# Patient Record
Sex: FEMALE | URBAN - METROPOLITAN AREA
[De-identification: names, ages, dates, MRNs, and addresses within clinical notes are randomized per-mention and may not be internally consistent; named-entity substitution may affect disease eponyms.]

---

## 2017-09-15 ENCOUNTER — IMPORTED ENCOUNTER (OUTPATIENT)
Dept: URBAN - METROPOLITAN AREA CLINIC 59 | Facility: CLINIC | Age: 47
End: 2017-09-15

## 2017-09-15 PROBLEM — H43.811 VITREOUS DETACHMENT OF RT EYE: Noted: 2017-09-15

## 2017-09-15 PROBLEM — H33.8 OTHER RETINAL DETACHMENTS: Noted: 2017-09-15

## 2017-09-15 PROBLEM — H04.122 TEAR FILM INSUFFICIENCY OF LT LACRIMAL GLAND: Noted: 2017-09-15

## 2017-09-15 PROBLEM — H04.121 TEAR FILM INSUFFICIENCY OF RT LACRIMAL GLAND: Noted: 2017-09-15

## 2017-09-15 PROBLEM — H43.813 VITREOUS DEGENERATION, BILATERAL: Noted: 2017-09-15

## 2017-09-15 PROBLEM — H44.23 DEGENERATIVE MYOPIA, BILATERAL: Noted: 2017-09-15

## 2017-09-15 PROBLEM — H25.13 BILATERAL NUCLEAR SCLEROSIS CATARACTS: Noted: 2017-09-15

## 2017-09-15 PROCEDURE — 99204 OFFICE O/P NEW MOD 45 MIN: CPT

## 2019-03-01 ENCOUNTER — IMPORTED ENCOUNTER (OUTPATIENT)
Dept: URBAN - METROPOLITAN AREA CLINIC 59 | Facility: CLINIC | Age: 49
End: 2019-03-01

## 2019-03-01 PROBLEM — H43.811 VITREOUS DETACHMENT OF RT EYE: Noted: 2019-03-01

## 2019-03-01 PROBLEM — H43.813 VITREOUS DEGENERATION, BILATERAL: Noted: 2019-03-01

## 2019-03-01 PROBLEM — H44.23 DEGENERATIVE MYOPIA, BILATERAL: Noted: 2019-03-01

## 2019-03-01 PROBLEM — H25.13 BILATERAL NUCLEAR SCLEROSIS CATARACTS: Noted: 2019-03-01

## 2019-03-01 PROBLEM — H04.122 TEAR FILM INSUFFICIENCY OF LT LACRIMAL GLAND: Noted: 2019-03-01

## 2019-03-01 PROBLEM — H04.121 TEAR FILM INSUFFICIENCY OF RT LACRIMAL GLAND: Noted: 2019-03-01

## 2019-03-01 PROBLEM — H33.8 OTHER RETINAL DETACHMENTS: Noted: 2019-03-01

## 2019-03-01 PROCEDURE — 92014 COMPRE OPH EXAM EST PT 1/>: CPT

## 2019-11-19 ENCOUNTER — APPOINTMENT (RX ONLY)
Dept: URBAN - METROPOLITAN AREA CLINIC 348 | Facility: CLINIC | Age: 49
Setting detail: DERMATOLOGY
End: 2019-11-19

## 2019-11-19 DIAGNOSIS — L81.4 OTHER MELANIN HYPERPIGMENTATION: ICD-10-CM

## 2019-11-19 DIAGNOSIS — L82.1 OTHER SEBORRHEIC KERATOSIS: ICD-10-CM

## 2019-11-19 DIAGNOSIS — D485 NEOPLASM OF UNCERTAIN BEHAVIOR OF SKIN: ICD-10-CM

## 2019-11-19 DIAGNOSIS — D22 MELANOCYTIC NEVI: ICD-10-CM

## 2019-11-19 DIAGNOSIS — D18.0 HEMANGIOMA: ICD-10-CM

## 2019-11-19 PROBLEM — E78.5 HYPERLIPIDEMIA, UNSPECIFIED: Status: ACTIVE | Noted: 2019-11-19

## 2019-11-19 PROBLEM — D48.5 NEOPLASM OF UNCERTAIN BEHAVIOR OF SKIN: Status: ACTIVE | Noted: 2019-11-19

## 2019-11-19 PROBLEM — D22.39 MELANOCYTIC NEVI OF OTHER PARTS OF FACE: Status: ACTIVE | Noted: 2019-11-19

## 2019-11-19 PROBLEM — D22.5 MELANOCYTIC NEVI OF TRUNK: Status: ACTIVE | Noted: 2019-11-19

## 2019-11-19 PROBLEM — D18.01 HEMANGIOMA OF SKIN AND SUBCUTANEOUS TISSUE: Status: ACTIVE | Noted: 2019-11-19

## 2019-11-19 PROCEDURE — 11102 TANGNTL BX SKIN SINGLE LES: CPT

## 2019-11-19 PROCEDURE — 99203 OFFICE O/P NEW LOW 30 MIN: CPT | Mod: 25

## 2019-11-19 PROCEDURE — ? COUNSELING

## 2019-11-19 PROCEDURE — ? DEFER

## 2019-11-19 PROCEDURE — ? REFERRAL

## 2019-11-19 PROCEDURE — ? BIOPSY BY SHAVE METHOD

## 2019-11-19 ASSESSMENT — LOCATION DETAILED DESCRIPTION DERM
LOCATION DETAILED: LEFT MID-UPPER BACK
LOCATION DETAILED: LEFT INFERIOR CENTRAL MALAR CHEEK
LOCATION DETAILED: LEFT INFERIOR UPPER BACK
LOCATION DETAILED: RIGHT SUPERIOR UPPER BACK
LOCATION DETAILED: RIGHT MID-UPPER BACK

## 2019-11-19 ASSESSMENT — LOCATION ZONE DERM
LOCATION ZONE: FACE
LOCATION ZONE: TRUNK

## 2019-11-19 ASSESSMENT — LOCATION SIMPLE DESCRIPTION DERM
LOCATION SIMPLE: LEFT CHEEK
LOCATION SIMPLE: RIGHT UPPER BACK
LOCATION SIMPLE: LEFT UPPER BACK

## 2019-11-19 NOTE — PROCEDURE: BIOPSY BY SHAVE METHOD
Bill For Surgical Tray: no
Silver Nitrate Text: The wound bed was treated with silver nitrate after the biopsy was performed.
Consent: Verbal consent was obtained and risks were reviewed including but not limited to scarring, infection, bleeding, scabbing, incomplete removal, nerve damage and allergy to anesthesia.
Billing Type: Third-Party Bill
Type Of Destruction Used: Curettage
Detail Level: Detailed
Wound Care: Aquaphor
Size Of Lesion In Cm: 0
Post-Care Instructions: I reviewed with the patient in detail post-care instructions. Patient is to keep the biopsy site dry overnight, and then apply aquaphor  twice daily until healed. Patient may apply hydrogen peroxide soaks to remove any crusting.
Dressing: bandage
Depth Of Biopsy: dermis
Biopsy Type: H and E
Notification Instructions: Signed consent obtained for patient to return to office in 2-4 week for biopsy results. Patient understands results will not be given over the phone.
Electrodesiccation Text: The wound bed was treated with electrodesiccation after the biopsy was performed.
Was A Bandage Applied: Yes
Curettage Text: The wound bed was treated with curettage after the biopsy was performed.
Cryotherapy Text: The wound bed was treated with cryotherapy after the biopsy was performed.
Anesthesia Volume In Cc (Will Not Render If 0): 0.5
Electrodesiccation And Curettage Text: The wound bed was treated with electrodesiccation and curettage after the biopsy was performed.
Hemostasis: Aluminum Chloride
Anesthesia Type: 1% lidocaine with 1:100,000 epinephrine and a 1:6 solution of 8.4% sodium bicarbonate

## 2020-02-21 ENCOUNTER — IMPORTED ENCOUNTER (OUTPATIENT)
Dept: URBAN - METROPOLITAN AREA CLINIC 59 | Facility: CLINIC | Age: 50
End: 2020-02-21

## 2020-02-21 PROBLEM — H04.121 TEAR FILM INSUFFICIENCY OF RT LACRIMAL GLAND: Noted: 2020-02-21

## 2020-02-21 PROBLEM — H25.13 BILATERAL NUCLEAR SCLEROSIS CATARACTS: Noted: 2020-02-21

## 2020-02-21 PROBLEM — H04.122 TEAR FILM INSUFFICIENCY OF LT LACRIMAL GLAND: Noted: 2020-02-21

## 2020-02-21 PROBLEM — H33.8 OTHER RETINAL DETACHMENTS: Noted: 2020-02-21

## 2020-02-21 PROBLEM — H43.811 VITREOUS DETACHMENT OF RT EYE: Noted: 2020-02-21

## 2020-02-21 PROCEDURE — 99214 OFFICE O/P EST MOD 30 MIN: CPT

## 2020-02-26 ENCOUNTER — IMPORTED ENCOUNTER (OUTPATIENT)
Dept: URBAN - METROPOLITAN AREA CLINIC 59 | Facility: CLINIC | Age: 50
End: 2020-02-26

## 2020-02-26 PROBLEM — H44.23 DEGENERATIVE MYOPIA, BILATERAL: Noted: 2020-02-26

## 2020-02-26 PROCEDURE — 92312 CONTACT LENS FITG APHAKIA OU: CPT

## 2021-01-20 ENCOUNTER — APPOINTMENT (RX ONLY)
Dept: URBAN - METROPOLITAN AREA CLINIC 348 | Facility: CLINIC | Age: 51
Setting detail: DERMATOLOGY
End: 2021-01-20

## 2021-01-20 DIAGNOSIS — D22 MELANOCYTIC NEVI: ICD-10-CM

## 2021-01-20 DIAGNOSIS — L82.1 OTHER SEBORRHEIC KERATOSIS: ICD-10-CM

## 2021-01-20 DIAGNOSIS — D485 NEOPLASM OF UNCERTAIN BEHAVIOR OF SKIN: ICD-10-CM

## 2021-01-20 DIAGNOSIS — D18.0 HEMANGIOMA: ICD-10-CM

## 2021-01-20 DIAGNOSIS — L81.4 OTHER MELANIN HYPERPIGMENTATION: ICD-10-CM

## 2021-01-20 PROBLEM — D18.01 HEMANGIOMA OF SKIN AND SUBCUTANEOUS TISSUE: Status: ACTIVE | Noted: 2021-01-20

## 2021-01-20 PROBLEM — I10 ESSENTIAL (PRIMARY) HYPERTENSION: Status: ACTIVE | Noted: 2021-01-20

## 2021-01-20 PROBLEM — D48.5 NEOPLASM OF UNCERTAIN BEHAVIOR OF SKIN: Status: ACTIVE | Noted: 2021-01-20

## 2021-01-20 PROBLEM — D22.5 MELANOCYTIC NEVI OF TRUNK: Status: ACTIVE | Noted: 2021-01-20

## 2021-01-20 PROCEDURE — 11300 SHAVE SKIN LESION 0.5 CM/<: CPT | Mod: 76

## 2021-01-20 PROCEDURE — ? SHAVE REMOVAL

## 2021-01-20 PROCEDURE — ? COUNSELING

## 2021-01-20 PROCEDURE — 11300 SHAVE SKIN LESION 0.5 CM/<: CPT

## 2021-01-20 PROCEDURE — 99213 OFFICE O/P EST LOW 20 MIN: CPT | Mod: 25

## 2021-01-20 ASSESSMENT — LOCATION ZONE DERM: LOCATION ZONE: TRUNK

## 2021-01-20 ASSESSMENT — LOCATION DETAILED DESCRIPTION DERM
LOCATION DETAILED: EPIGASTRIC SKIN
LOCATION DETAILED: LEFT MID-UPPER BACK
LOCATION DETAILED: RIGHT SUPERIOR UPPER BACK
LOCATION DETAILED: LEFT INFERIOR LATERAL UPPER BACK
LOCATION DETAILED: RIGHT MID-UPPER BACK

## 2021-01-20 ASSESSMENT — LOCATION SIMPLE DESCRIPTION DERM
LOCATION SIMPLE: LEFT UPPER BACK
LOCATION SIMPLE: ABDOMEN
LOCATION SIMPLE: RIGHT UPPER BACK

## 2021-01-20 NOTE — PROCEDURE: SHAVE REMOVAL
X Size Of Lesion In Cm (Optional): 0
Anesthesia Volume In Cc: 1
Wound Care: Aquaphor
Was A Bandage Applied: Yes
Render Post-Care Instructions In Note?: no
Post-Care Instructions: I reviewed with the patient in detail post-care instructions. Patient is to keep the biopsy site dry overnight, and then apply bacitracin twice daily until healed. Patient may apply hydrogen peroxide soaks to remove any crusting.
Billing Type: Third-Party Bill
Medical Necessity Information: It is in your best interest to select a reason for this procedure from the list below. All of these items fulfill various CMS LCD requirements except the new and changing color options.
Detail Level: Detailed
Size Of Lesion In Cm (Required): 0.5
Notification Instructions: Patient will return to clinic in 2-4 weeks for biopsy results.
Consent was obtained from the patient. The risks and benefits to therapy were discussed in detail. Specifically, the risks of infection, scarring, bleeding, prolonged wound healing, incomplete removal, allergy to anesthesia, nerve injury and recurrence were addressed. Prior to the procedure, the treatment site was clearly identified and confirmed by the patient. All components of Universal Protocol/PAUSE Rule completed.
Biopsy Method: double edge Personna blade
Hemostasis: Aluminum Chloride
Path Notes (To The Dermatopathologist): Please check margins.
Medical Necessity Clause: This procedure was medically necessary because the lesion that was treated was:
Anesthesia Type: 1% lidocaine without epinephrine

## 2021-03-26 ENCOUNTER — IMPORTED ENCOUNTER (OUTPATIENT)
Dept: URBAN - METROPOLITAN AREA CLINIC 59 | Facility: CLINIC | Age: 51
End: 2021-03-26

## 2021-03-26 PROBLEM — H33.8 OTHER RETINAL DETACHMENTS: Noted: 2021-03-26

## 2021-03-26 PROBLEM — H04.122 TEAR FILM INSUFFICIENCY OF LT LACRIMAL GLAND: Noted: 2021-03-26

## 2021-03-26 PROBLEM — H25.13 BILATERAL NUCLEAR SCLEROSIS CATARACTS: Noted: 2021-03-26

## 2021-03-26 PROBLEM — H04.121 TEAR FILM INSUFFICIENCY OF RT LACRIMAL GLAND: Noted: 2021-03-26

## 2021-03-26 PROBLEM — H43.811 VITREOUS DETACHMENT OF RT EYE: Noted: 2021-03-26

## 2021-03-26 PROCEDURE — 99214 OFFICE O/P EST MOD 30 MIN: CPT

## 2021-04-06 ENCOUNTER — IMPORTED ENCOUNTER (OUTPATIENT)
Dept: URBAN - METROPOLITAN AREA CLINIC 59 | Facility: CLINIC | Age: 51
End: 2021-04-06

## 2021-04-06 PROBLEM — H44.23 DEGENERATIVE MYOPIA, BILATERAL: Noted: 2021-04-06

## 2021-04-06 PROCEDURE — 92312 CONTACT LENS FITG APHAKIA OU: CPT

## 2023-10-21 ASSESSMENT — VISUAL ACUITY
OD_CC: 20/25
OU_CC: 20/40
OS_CC: 20/100+2
OD_CC: 20/50+2
OS_CC: 20/150+1
OD_CC: 20/40
OD_CC: 20/30
OS_CC: J10
OS_CC: 20/100-1
OS_CC: 20/150+2
OD_CC: 20/40+2
OD_CC: 20/40-2
OS_CC: 20/100+2
OS_CC: 20/150-1
OD_CC: J1

## 2023-10-21 ASSESSMENT — TONOMETRY
OD_IOP_MMHG: 18
OD_IOP_MMHG: 15
OS_IOP_MMHG: 14
OS_IOP_MMHG: 16
OS_IOP_MMHG: 16
OD_IOP_MMHG: 14
OD_IOP_MMHG: 14
OS_IOP_MMHG: 14